# Patient Record
Sex: FEMALE | Race: WHITE | NOT HISPANIC OR LATINO
[De-identification: names, ages, dates, MRNs, and addresses within clinical notes are randomized per-mention and may not be internally consistent; named-entity substitution may affect disease eponyms.]

---

## 2017-01-05 ENCOUNTER — RECORD ABSTRACTING (OUTPATIENT)
Age: 22
End: 2017-01-05

## 2017-01-05 DIAGNOSIS — Z86.32 PERSONAL HISTORY OF GESTATIONAL DIABETES: ICD-10-CM

## 2017-01-05 DIAGNOSIS — O09.899 SUPERVISION OF OTHER HIGH RISK PREGNANCIES, UNSPECIFIED TRIMESTER: ICD-10-CM

## 2017-01-05 DIAGNOSIS — Z78.9 OTHER SPECIFIED HEALTH STATUS: ICD-10-CM

## 2018-01-08 ENCOUNTER — EMERGENCY (EMERGENCY)
Facility: HOSPITAL | Age: 23
LOS: 0 days | Discharge: HOME | End: 2018-01-08

## 2018-01-08 DIAGNOSIS — R10.30 LOWER ABDOMINAL PAIN, UNSPECIFIED: ICD-10-CM

## 2018-01-08 DIAGNOSIS — O99.89 OTHER SPECIFIED DISEASES AND CONDITIONS COMPLICATING PREGNANCY, CHILDBIRTH AND THE PUERPERIUM: ICD-10-CM

## 2018-01-08 DIAGNOSIS — N93.9 ABNORMAL UTERINE AND VAGINAL BLEEDING, UNSPECIFIED: ICD-10-CM

## 2020-08-26 ENCOUNTER — OUTPATIENT (OUTPATIENT)
Dept: OUTPATIENT SERVICES | Facility: HOSPITAL | Age: 25
LOS: 1 days | Discharge: HOME | End: 2020-08-26
Payer: COMMERCIAL

## 2020-08-26 DIAGNOSIS — R10.2 PELVIC AND PERINEAL PAIN: ICD-10-CM

## 2020-08-26 DIAGNOSIS — D25.9 LEIOMYOMA OF UTERUS, UNSPECIFIED: ICD-10-CM

## 2020-08-26 PROCEDURE — 76856 US EXAM PELVIC COMPLETE: CPT | Mod: 26

## 2020-08-26 PROCEDURE — 76830 TRANSVAGINAL US NON-OB: CPT | Mod: 26

## 2024-07-24 ENCOUNTER — APPOINTMENT (OUTPATIENT)
Dept: OBGYN | Facility: CLINIC | Age: 29
End: 2024-07-24
Payer: COMMERCIAL

## 2024-07-24 VITALS
HEART RATE: 89 BPM | DIASTOLIC BLOOD PRESSURE: 82 MMHG | SYSTOLIC BLOOD PRESSURE: 132 MMHG | HEIGHT: 63 IN | WEIGHT: 156 LBS | BODY MASS INDEX: 27.64 KG/M2 | TEMPERATURE: 98.6 F

## 2024-07-24 DIAGNOSIS — Z01.419 ENCOUNTER FOR GYNECOLOGICAL EXAMINATION (GENERAL) (ROUTINE) W/OUT ABNORMAL FINDINGS: ICD-10-CM

## 2024-07-24 DIAGNOSIS — N92.6 IRREGULAR MENSTRUATION, UNSPECIFIED: ICD-10-CM

## 2024-07-24 DIAGNOSIS — R10.2 PELVIC AND PERINEAL PAIN: ICD-10-CM

## 2024-07-24 PROCEDURE — 99385 PREV VISIT NEW AGE 18-39: CPT | Mod: 25

## 2024-07-24 PROCEDURE — 99213 OFFICE O/P EST LOW 20 MIN: CPT | Mod: 25

## 2024-07-24 RX ORDER — METFORMIN HYDROCHLORIDE 500 MG/1
500 TABLET, COATED ORAL DAILY
Qty: 30 | Refills: 3 | Status: ACTIVE | COMMUNITY
Start: 2024-07-24 | End: 1900-01-01

## 2024-07-24 NOTE — HISTORY OF PRESENT ILLNESS
[Patient reported PAP Smear was normal] : Patient reported PAP Smear was normal [Y] : Positive pregnancy history [Menarche Age: ____] : age at menarche was [unfilled] [Currently Active] : currently active [Men] : men [No] : No [TextBox_4] : [facility] No history of fibroids, cysts, or STDs 13/irreg /5 last pap 2020  [PapSmeardate] : 2020 [LMPDate] : 7/10/24 [MensesFreq] : 28-07 [MensesLength] : 5 [MensesAmount] : heavy to mod [PGHxTotal] : 4 [[facility]] : 3 [PGHxAbortions] : 1 [[facility]] : 3 [PGHxABSpont] : 1 [FreeTextEntry1] : 7/10/24

## 2024-07-24 NOTE — HISTORY OF PRESENT ILLNESS
[Patient reported PAP Smear was normal] : Patient reported PAP Smear was normal [Y] : Positive pregnancy history [Menarche Age: ____] : age at menarche was [unfilled] [Currently Active] : currently active [Men] : men [No] : No [TextBox_4] : GY[facility]X No history of fibroids, cysts, or STDs 13/irreg /5 last pap 2020  [PapSmeardate] : 2020 [LMPDate] : 7/10/24 [MensesFreq] : 28-16 [MensesLength] : 5 [MensesAmount] : heavy to mod [PGHxTotal] : 4 [[facility]] : 3 [PGHxAbortions] : 1 [[facility] Center[facility]] : 3 [PGHxABSpont] : 1 [FreeTextEntry1] : 7/10/24

## 2024-07-24 NOTE — DISCUSSION/SUMMARY
[FreeTextEntry1] : 30 yo [id number] annual gyn, irregular menses, rlq pain r/o hernia vs ovarian cyst pap hpv hormonal w/up  pelvic sono to do  metformin 500 mg daily declined ocp possibly general sx consult to r/o hernia

## 2024-07-24 NOTE — PHYSICAL EXAM
[Appropriately responsive] : appropriately responsive [Alert] : alert [No Acute Distress] : no acute distress [No Lymphadenopathy] : no lymphadenopathy [Soft] : soft [Non-tender] : non-tender [Non-distended] : non-distended [No HSM] : No HSM [No Lesions] : no lesions [No Mass] : no mass [Oriented x3] : oriented x3 [Examination Of The Breasts] : a normal appearance [No Discharge] : no discharge [No Masses] : no breast masses were palpable [Labia Majora] : normal [Labia Minora] : normal [Normal] : normal [Uterine Adnexae] : normal [FreeTextEntry6] : pain mostly near inguinal area ? hernia

## 2024-07-25 LAB
ANTI-MUELLERIAN HORMONE: 7.06 NG/ML
ESTRADIOL SERPL-MCNC: 153 PG/ML
FSH SERPL-MCNC: 3.8 IU/L
LH SERPL-ACNC: 12.1 IU/L
PROGEST SERPL-MCNC: 0.44 NG/ML
PROLACTIN SERPL-MCNC: 12.9 NG/ML
SHBG SERPL-SCNC: 43 NMOL/L
T4 FREE SERPL-MCNC: 1.2 NG/DL
TSH SERPL-ACNC: 1.72 UIU/ML

## 2024-07-26 LAB
HPV HIGH+LOW RISK DNA PNL CVX: NOT DETECTED
TESTOST FREE SERPL-MCNC: 0.8 PG/ML
TESTOST SERPL-MCNC: 51.2 NG/DL

## 2024-07-29 LAB
ANDROST SERPL-MCNC: 302 NG/DL
DHEA SERPL-MCNC: 1524 NG/DL

## 2024-07-30 LAB — CYTOLOGY CVX/VAG DOC THIN PREP: NORMAL

## 2024-08-12 ENCOUNTER — ASOB RESULT (OUTPATIENT)
Age: 29
End: 2024-08-12

## 2024-08-12 ENCOUNTER — APPOINTMENT (OUTPATIENT)
Dept: OBGYN | Facility: CLINIC | Age: 29
End: 2024-08-12
Payer: COMMERCIAL

## 2024-08-12 VITALS
BODY MASS INDEX: 27.46 KG/M2 | DIASTOLIC BLOOD PRESSURE: 79 MMHG | WEIGHT: 155 LBS | TEMPERATURE: 98.6 F | HEART RATE: 85 BPM | SYSTOLIC BLOOD PRESSURE: 128 MMHG | HEIGHT: 63 IN

## 2024-08-12 DIAGNOSIS — N92.6 IRREGULAR MENSTRUATION, UNSPECIFIED: ICD-10-CM

## 2024-08-12 PROCEDURE — 76830 TRANSVAGINAL US NON-OB: CPT

## 2024-08-12 PROCEDURE — 99213 OFFICE O/P EST LOW 20 MIN: CPT | Mod: 25

## 2024-08-12 PROCEDURE — ZZZZZ: CPT

## 2024-08-12 NOTE — DISCUSSION/SUMMARY
[FreeTextEntry1] : [id number] H/O IRREGULAR PERIODS, hirsutism SONO WNL Birth control versus metformin discussed with patient She would like to continue metformin for now Return to office in 3 months, if cycle regulated continue metformin if not we will consider OCPs

## 2024-08-12 NOTE — DISCUSSION/SUMMARY
[FreeTextEntry1] : [id number] H/O IRREGULAR PERIODS, hirsutism SONO WNL Birth control versus metformin discussed with patient She would like to continue metformin for now Return to office in 3 months, if cycle regulated continue metformin if not we will consider OCPs

## 2024-08-12 NOTE — PROCEDURE
[Transvaginal Ultrasound] : transvaginal ultrasound [Abnormal Uterine Bleeding] : abnormal uterine bleeding [FreeTextEntry4] : WNL

## 2024-08-12 NOTE — HISTORY OF PRESENT ILLNESS
[Menarche Age: ____] : age at menarche was [unfilled] [Currently Active] : currently active [Men] : men [No] : No [FreeTextEntry1] : 7/10/24

## 2024-08-12 NOTE — PROCEDURE
[Abnormal Uterine Bleeding] : abnormal uterine bleeding [Transvaginal Ultrasound] : transvaginal ultrasound [FreeTextEntry4] : WNL

## 2024-11-13 ENCOUNTER — APPOINTMENT (OUTPATIENT)
Dept: OBGYN | Facility: CLINIC | Age: 29
End: 2024-11-13